# Patient Record
Sex: MALE | Race: ASIAN | NOT HISPANIC OR LATINO | Employment: OTHER | URBAN - METROPOLITAN AREA
[De-identification: names, ages, dates, MRNs, and addresses within clinical notes are randomized per-mention and may not be internally consistent; named-entity substitution may affect disease eponyms.]

---

## 2018-12-19 ENCOUNTER — OFFICE VISIT - HEALTHEAST (OUTPATIENT)
Dept: FAMILY MEDICINE | Facility: CLINIC | Age: 58
End: 2018-12-19

## 2018-12-19 DIAGNOSIS — Z00.00 ROUTINE GENERAL MEDICAL EXAMINATION AT A HEALTH CARE FACILITY: ICD-10-CM

## 2018-12-19 DIAGNOSIS — Z28.39 INCOMPLETE IMMUNIZATION STATUS: ICD-10-CM

## 2018-12-19 LAB
ALBUMIN SERPL-MCNC: 4.1 G/DL (ref 3.5–5)
ALBUMIN UR-MCNC: NEGATIVE MG/DL
ALP SERPL-CCNC: 58 U/L (ref 45–120)
ALT SERPL W P-5'-P-CCNC: 26 U/L (ref 0–45)
ANION GAP SERPL CALCULATED.3IONS-SCNC: 9 MMOL/L (ref 5–18)
APPEARANCE UR: CLEAR
AST SERPL W P-5'-P-CCNC: 17 U/L (ref 0–40)
ATRIAL RATE - MUSE: 77 BPM
BACTERIA #/AREA URNS HPF: ABNORMAL HPF
BILIRUB SERPL-MCNC: 0.6 MG/DL (ref 0–1)
BILIRUB UR QL STRIP: NEGATIVE
BUN SERPL-MCNC: 11 MG/DL (ref 8–22)
CALCIUM SERPL-MCNC: 9.5 MG/DL (ref 8.5–10.5)
CHLORIDE BLD-SCNC: 104 MMOL/L (ref 98–107)
CHOLEST SERPL-MCNC: 155 MG/DL
CO2 SERPL-SCNC: 27 MMOL/L (ref 22–31)
COLOR UR AUTO: YELLOW
CREAT SERPL-MCNC: 0.74 MG/DL (ref 0.7–1.3)
DIASTOLIC BLOOD PRESSURE - MUSE: NORMAL MMHG
ERYTHROCYTE [DISTWIDTH] IN BLOOD BY AUTOMATED COUNT: 12.2 % (ref 11–14.5)
FASTING STATUS PATIENT QL REPORTED: YES
GFR SERPL CREATININE-BSD FRML MDRD: >60 ML/MIN/1.73M2
GLUCOSE BLD-MCNC: 101 MG/DL (ref 70–125)
GLUCOSE UR STRIP-MCNC: NEGATIVE MG/DL
HCT VFR BLD AUTO: 45 % (ref 40–54)
HDLC SERPL-MCNC: 53 MG/DL
HGB BLD-MCNC: 15.3 G/DL (ref 14–18)
HGB UR QL STRIP: NEGATIVE
INTERPRETATION ECG - MUSE: NORMAL
KETONES UR STRIP-MCNC: NEGATIVE MG/DL
LDLC SERPL CALC-MCNC: 84 MG/DL
LEUKOCYTE ESTERASE UR QL STRIP: NEGATIVE
MCH RBC QN AUTO: 30.7 PG (ref 27–34)
MCHC RBC AUTO-ENTMCNC: 33.9 G/DL (ref 32–36)
MCV RBC AUTO: 91 FL (ref 80–100)
MUCOUS THREADS #/AREA URNS LPF: ABNORMAL LPF
NITRATE UR QL: NEGATIVE
P AXIS - MUSE: 63 DEGREES
PH UR STRIP: 5.5 [PH] (ref 5–8)
PLATELET # BLD AUTO: 281 THOU/UL (ref 140–440)
PMV BLD AUTO: 7.2 FL (ref 7–10)
POTASSIUM BLD-SCNC: 4.3 MMOL/L (ref 3.5–5)
PR INTERVAL - MUSE: 142 MS
PROT SERPL-MCNC: 7.1 G/DL (ref 6–8)
PSA SERPL-MCNC: 1.6 NG/ML (ref 0–3.5)
QRS DURATION - MUSE: 86 MS
QT - MUSE: 380 MS
QTC - MUSE: 430 MS
R AXIS - MUSE: 32 DEGREES
RBC # BLD AUTO: 4.96 MILL/UL (ref 4.4–6.2)
RBC #/AREA URNS AUTO: ABNORMAL HPF
SODIUM SERPL-SCNC: 140 MMOL/L (ref 136–145)
SP GR UR STRIP: >=1.03 (ref 1–1.03)
SQUAMOUS #/AREA URNS AUTO: ABNORMAL LPF
SYSTOLIC BLOOD PRESSURE - MUSE: NORMAL MMHG
T AXIS - MUSE: 50 DEGREES
TRIGL SERPL-MCNC: 92 MG/DL
UROBILINOGEN UR STRIP-ACNC: ABNORMAL
VENTRICULAR RATE- MUSE: 77 BPM
WBC #/AREA URNS AUTO: ABNORMAL HPF
WBC: 5.4 THOU/UL (ref 4–11)

## 2018-12-19 ASSESSMENT — MIFFLIN-ST. JEOR: SCORE: 1578.05

## 2018-12-20 LAB
BACTERIA SPEC CULT: NO GROWTH
HBV SURFACE AB SERPL IA-ACNC: NEGATIVE M[IU]/ML
HCV AB SERPL QL IA: NEGATIVE

## 2018-12-21 ENCOUNTER — COMMUNICATION - HEALTHEAST (OUTPATIENT)
Dept: FAMILY MEDICINE | Facility: CLINIC | Age: 58
End: 2018-12-21

## 2018-12-21 LAB — HAV IGG SER QL IA: POSITIVE

## 2019-05-03 ENCOUNTER — OFFICE VISIT - HEALTHEAST (OUTPATIENT)
Dept: FAMILY MEDICINE | Facility: CLINIC | Age: 59
End: 2019-05-03

## 2019-05-03 DIAGNOSIS — Z12.11 SCREEN FOR COLON CANCER: ICD-10-CM

## 2019-05-03 DIAGNOSIS — Z71.84 COUNSELING ABOUT TRAVEL: ICD-10-CM

## 2019-05-04 LAB — HBV SURFACE AG SERPL QL IA: NEGATIVE

## 2019-05-06 ENCOUNTER — COMMUNICATION - HEALTHEAST (OUTPATIENT)
Dept: FAMILY MEDICINE | Facility: CLINIC | Age: 59
End: 2019-05-06

## 2019-05-06 DIAGNOSIS — Z71.84 COUNSELING ABOUT TRAVEL: ICD-10-CM

## 2019-05-06 LAB — MEV IGG SER IA-ACNC: POSITIVE

## 2019-06-03 ENCOUNTER — COMMUNICATION - HEALTHEAST (OUTPATIENT)
Dept: FAMILY MEDICINE | Facility: CLINIC | Age: 59
End: 2019-06-03

## 2019-06-03 ENCOUNTER — AMBULATORY - HEALTHEAST (OUTPATIENT)
Dept: NURSING | Facility: CLINIC | Age: 59
End: 2019-06-03

## 2019-06-03 DIAGNOSIS — Z00.00 HEALTHCARE MAINTENANCE: ICD-10-CM

## 2020-03-11 ENCOUNTER — OFFICE VISIT - HEALTHEAST (OUTPATIENT)
Dept: FAMILY MEDICINE | Facility: CLINIC | Age: 60
End: 2020-03-11

## 2020-03-11 DIAGNOSIS — Z00.00 ROUTINE GENERAL MEDICAL EXAMINATION AT A HEALTH CARE FACILITY: ICD-10-CM

## 2020-03-11 DIAGNOSIS — N52.9 ERECTILE DYSFUNCTION, UNSPECIFIED ERECTILE DYSFUNCTION TYPE: ICD-10-CM

## 2020-03-11 LAB
ALBUMIN SERPL-MCNC: 4 G/DL (ref 3.5–5)
ALBUMIN UR-MCNC: NEGATIVE MG/DL
ALP SERPL-CCNC: 66 U/L (ref 45–120)
ALT SERPL W P-5'-P-CCNC: 34 U/L (ref 0–45)
AMORPH CRY #/AREA URNS HPF: ABNORMAL /[HPF]
ANION GAP SERPL CALCULATED.3IONS-SCNC: 9 MMOL/L (ref 5–18)
APPEARANCE UR: CLEAR
AST SERPL W P-5'-P-CCNC: 19 U/L (ref 0–40)
ATRIAL RATE - MUSE: 86 BPM
BACTERIA #/AREA URNS HPF: ABNORMAL HPF
BILIRUB SERPL-MCNC: 0.6 MG/DL (ref 0–1)
BILIRUB UR QL STRIP: NEGATIVE
BUN SERPL-MCNC: 10 MG/DL (ref 8–22)
CALCIUM SERPL-MCNC: 9.2 MG/DL (ref 8.5–10.5)
CHLORIDE BLD-SCNC: 103 MMOL/L (ref 98–107)
CHOLEST SERPL-MCNC: 159 MG/DL
CO2 SERPL-SCNC: 28 MMOL/L (ref 22–31)
COLOR UR AUTO: YELLOW
CREAT SERPL-MCNC: 0.82 MG/DL (ref 0.7–1.3)
DIASTOLIC BLOOD PRESSURE - MUSE: NORMAL
ERYTHROCYTE [DISTWIDTH] IN BLOOD BY AUTOMATED COUNT: 12.6 % (ref 11–14.5)
FASTING STATUS PATIENT QL REPORTED: YES
GFR SERPL CREATININE-BSD FRML MDRD: >60 ML/MIN/1.73M2
GLUCOSE BLD-MCNC: 94 MG/DL (ref 70–125)
GLUCOSE UR STRIP-MCNC: NEGATIVE MG/DL
HCT VFR BLD AUTO: 44.6 % (ref 40–54)
HDLC SERPL-MCNC: 52 MG/DL
HGB BLD-MCNC: 15 G/DL (ref 14–18)
HGB UR QL STRIP: NEGATIVE
INTERPRETATION ECG - MUSE: NORMAL
KETONES UR STRIP-MCNC: NEGATIVE MG/DL
LDLC SERPL CALC-MCNC: 84 MG/DL
LEUKOCYTE ESTERASE UR QL STRIP: NEGATIVE
MCH RBC QN AUTO: 30.6 PG (ref 27–34)
MCHC RBC AUTO-ENTMCNC: 33.7 G/DL (ref 32–36)
MCV RBC AUTO: 91 FL (ref 80–100)
NITRATE UR QL: NEGATIVE
P AXIS - MUSE: 45 DEGREES
PH UR STRIP: 5.5 [PH] (ref 5–8)
PLATELET # BLD AUTO: 257 THOU/UL (ref 140–440)
PMV BLD AUTO: 7.7 FL (ref 7–10)
POTASSIUM BLD-SCNC: 4.1 MMOL/L (ref 3.5–5)
PR INTERVAL - MUSE: 122 MS
PROT SERPL-MCNC: 7 G/DL (ref 6–8)
PSA SERPL-MCNC: 2.2 NG/ML (ref 0–4.5)
QRS DURATION - MUSE: 86 MS
QT - MUSE: 384 MS
QTC - MUSE: 459 MS
R AXIS - MUSE: -7 DEGREES
RBC # BLD AUTO: 4.91 MILL/UL (ref 4.4–6.2)
RBC #/AREA URNS AUTO: ABNORMAL HPF
SODIUM SERPL-SCNC: 140 MMOL/L (ref 136–145)
SP GR UR STRIP: >=1.03 (ref 1–1.03)
SQUAMOUS #/AREA URNS AUTO: ABNORMAL LPF
SYSTOLIC BLOOD PRESSURE - MUSE: NORMAL
T AXIS - MUSE: 45 DEGREES
TRIGL SERPL-MCNC: 117 MG/DL
UROBILINOGEN UR STRIP-ACNC: NORMAL
VENTRICULAR RATE- MUSE: 86 BPM
WBC #/AREA URNS AUTO: ABNORMAL HPF
WBC: 5.5 THOU/UL (ref 4–11)

## 2020-03-11 ASSESSMENT — MIFFLIN-ST. JEOR: SCORE: 1602.88

## 2020-03-12 ENCOUNTER — COMMUNICATION - HEALTHEAST (OUTPATIENT)
Dept: FAMILY MEDICINE | Facility: CLINIC | Age: 60
End: 2020-03-12

## 2020-06-18 ENCOUNTER — COMMUNICATION - HEALTHEAST (OUTPATIENT)
Dept: FAMILY MEDICINE | Facility: CLINIC | Age: 60
End: 2020-06-18

## 2021-05-28 NOTE — TELEPHONE ENCOUNTER
Left message to call back for: medication problem  Information to relay to patient:  Below message

## 2021-05-28 NOTE — TELEPHONE ENCOUNTER
Reason contacted:  Medication problem  Information relayed:  Spoke with patient who would like oral typhoid (Vivotif) resent [facility] [address] here in [place].    Please resend rx.     Additional questions:  No  Further follow-up needed:  Yes  Okay to leave a detailed message:  Yes

## 2021-05-28 NOTE — TELEPHONE ENCOUNTER
Central PA team  [phone]  Pool: HE PA MED ([id number])          PA has been initiated.       PA form completed and faxed insurance via Cover My Meds     Key:  [id number]     Medication:  VIVOTIF CAPSULES    Insurance:  [facility]        Response will be received via fax and may take up to 5-10 business days depending on plan

## 2021-05-28 NOTE — TELEPHONE ENCOUNTER
Patient Returning Call  Reason for call:  Patient called back.  Information relayed to patient:  Informed of message below.  Patient states he wants to speak with Lashon.  Please call him back.  Patient has additional questions:  Yes  If YES, what are your questions/concerns:  As above.  Okay to leave a detailed message?: Yes

## 2021-05-28 NOTE — TELEPHONE ENCOUNTER
Request has been submitted via [facility]. Waiting for questions to generate before completing PA.

## 2021-05-28 NOTE — TELEPHONE ENCOUNTER
Fax received from [facility], they have started the Prior Authorization Process via Cover My Meds    CoverMyMeds Key: [id number]    Medication Name:   typhoid (VIVOTIF) SR capsule 4 capsule 0 5/3/2019     Sig - Route: Take 1 capsule by mouth every other day. - Oral          Insurance Plan:   [facility]  Patient ID: [id number]    Please complete the PA process

## 2021-05-28 NOTE — PROGRESS NOTES
Patient ID: Wayne Jack is a 58 y.o. male.  /76   Pulse 100   Wt 185 lb (83.9 kg)   SpO2 96%   BMI 30.32 kg/m      Assessment/Plan:                   Diagnoses and all orders for this visit:    Screen for colon cancer    Counseling about travel  -     Rubeola Antibody, IgG  -     typhoid (VIVOTIF) SR capsule; Take 1 capsule by mouth every other day.  Dispense: 4 capsule; Refill: 0  -     Hepatitis B Surface Antigen (HBsAG)    Other orders  -     Cancel: Ambulatory referral for Colonoscopy  -     Td, Preservative Free (green label)  -     Hepatitis B Vaccine Age 20 years and above          DISCUSSION  Today we spent 25 minutes reviewing all of these considerations outlined below.  We will immunized with tetanus and hepatitis B #1 today.  Will obtain laboratory testing for hepatitis B surface antigen and to check a measles vaccination titer to see if he is immune.  If he is not immune to measles we will plan to immunize prior to travel.  Subjective:     HPI    Wayne Jack is a 58 y.o. male who will be traveling to [facility] in June 2019.  He will be staying in the city of [place] and visiting family.  He is travel to [facility] previously.  Today we reviewed the CDC website recommendations regarding travel and more specifically regarding immunization and disease prevention.    This past year he was screened with laboratory testing including hepatitis B surface antibody test which was negative indicating he is not immune to hepatitis B.  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  He does not report any specific situation which makes him concerned but would like to have testing.  We discussed obtaining a hepatitis B surface antigen test for this purpose.  We also discussed that even though he reports having had vaccination series for hepatitis B he is not immune.  At this time I would recommend that he undergo the vaccination  series and that we plan to check a titer upon completion.  Did discuss that we did confirm immunity for hepatitis A and that he does not have any sign of hepatitis C infection in the past.    We reviewed the need for other immunizations.  He is due for a tetanus vaccine and will get this today.  We discussed recent concerns regarding measles.  He does not have any documentation or specific knowledge about his measles immunity.  We discussed that based on all factors I think the best approach would be to check a measles titer and either immunize based on that or provide him with information for proof of immunity if he is immune.    Based on his travel plans according to the [facility] website he will not need malaria prophylaxis and this is discussed.  Also no indication for rabies vaccination or Japanese encephalitis.    We discussed typhoid immunization and felt the best way to accomplish this based on the timeframe would be to use the oral typhoid vaccine and a prescription is sent to his pharmacy.    Recommended that he review further information regarding travel to [facility] on the [email or web address] website.  Discussed basics of travel today.    Review of Systems  Complete review of systems is obtained.  Other than the specific considerations noted above complete review of systems is negative.          Objective:   Medications:  Current Outpatient Medications   Medication Sig     typhoid (VIVOTIF) SR capsule Take 1 capsule by mouth every other day.       Allergies:  No Known Allergies    Tobacco:   reports that he has never smoked. He has never used smokeless tobacco.     Physical Exam          /76   Pulse 100   Wt 185 lb (83.9 kg)   SpO2 96%   BMI 30.32 kg/m          General Appearance:    Alert, cooperative, no distress

## 2021-06-02 VITALS — HEIGHT: 66 IN | BODY MASS INDEX: 29.88 KG/M2 | WEIGHT: 185.9 LBS

## 2021-06-03 VITALS — WEIGHT: 185 LBS | BODY MASS INDEX: 30.32 KG/M2

## 2021-06-04 VITALS
HEART RATE: 85 BPM | SYSTOLIC BLOOD PRESSURE: 112 MMHG | TEMPERATURE: 98.5 F | WEIGHT: 191.38 LBS | BODY MASS INDEX: 30.76 KG/M2 | HEIGHT: 66 IN | OXYGEN SATURATION: 95 % | DIASTOLIC BLOOD PRESSURE: 60 MMHG

## 2021-06-06 NOTE — PROGRESS NOTES
CC: I am here for a physical.  I feel well.    HPI: I am seeing Wayne for his annual physical exam.  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  He is enjoyed a good year of health.  He is scheduled for colonoscopy and is getting a little bit of cold feet syndrome but we did talk this over and the pros and cons of therapy and prophylaxis have convince him to go ahead with the already scheduled colonoscopy.  Fortunately his prostate exam was normal today.  I have reviewed his past medical history social history surgeries and allergies.  He denies any visual concerns he has some slight tinnitus in the right ear but it does not really bother him this is not a new finding its been there for a couple of years.  We did discuss a cats diet last year but he has been unable to cut back on his tea and his coffee the TV and cultural problem.  Denies any dysphasia shortness of breath dyspnea chest pain angina abdominal pain diarrhea constipation urgency frequency dysuria he has mild ED but is able to perform at least once per week and he and his wife are happy with the situation.  He has a son who is a senior in college and is in [employer] sciences.  All medical questions asked were answered I personally reviewed family social history surgeries allergies problems list pleasure to see him again his EKG was reviewed with him and we will go over his test with him via computer or phone call.      Review of Systems: A complete 14 point review of systems was obtained and is negative or as stated in the history of present illness.    No past medical history on file.  No family history on file.  No past surgical history on file.  Social History     Tobacco Use     Smoking status: Never Smoker     Smokeless tobacco: Never Used   Substance Use Topics     Alcohol use: No     Frequency: Never     Drug use: No       PE:   /60 (Patient Site: Right Arm, Patient Position:  "Sitting, Cuff Size: Adult Large)   Pulse 85   Temp 98.5  F (36.9  C) (Oral)   Ht 5' 5.5\" (1.664 m)   Wt 191 lb 6 oz (86.8 kg)   SpO2 95%   BMI 31.36 kg/m       General Appearance:  Alert, cooperative, no distress  Head:  Normocephalic, no obvious abnormality  Ears: TM anatomy normal  Eyes:  PERRL, EOM's intact, conjunctiva and corneas clear  Nose:  Nares symmetrical, septum midline, mucosa pink, no sinus tenderness  Throat:  Lips, tongue, and mucosa are moist, pink, and intact  Neck:  Supple, symmetrical, trachea midline, no adenopathy; thyroid: no enlargement, symmetric,no tenderness/mass/nodules; no carotid bruit, no JVD  Back:  Symmetrical, no curvature, ROM normal, no CVA tenderness  Chest/Breast:  No mass or tenderness  Lungs:  Clear to auscultation bilaterally, respirations unlabored   Heart:  Normal PMI, regular rate & rhythm, S1 and S2 normal, no murmurs, rubs, or gallops  Abdomen:  Soft, non-tender, bowel sounds active all four quadrants, no mass, or organomegaly  Musculoskeletal:  Tone and strength strong and symmetrical, all extremities  Lymphatic:  No adenopathy  Skin/Hair/Nails:  Skin warm, dry, and intact, no rashes  Neurologic:  Alert and oriented x3, no cranial nerve deficits, normal strength and tone, gait steady  Extremities:  No edema.  Alfonso's sign negative.    Genitourinary: Circumcised male testes normal no varicocele prostate normal external rectal exam normal  Pulses:  Equal bilaterally    Impression:     1. Routine general medical examination at a health care facility  Comprehensive Metabolic Panel    HM2(CBC w/o Differential)    Lipid Cascade    PSA (Prostatic-Specific Antigen), Annual Screen    Urinalysis-UC if Indicated    Electrocardiogram Perform - Clinic   2. Erectile dysfunction, unspecified erectile dysfunction type  Comprehensive Metabolic Panel    Lipid Cascade        PLAN:   1. Routine general medical examination at a health care facility  omprehensive Metabolic Panel  - " HM2(CBC w/o Differential)  - Lipid Cascade  - PSA (Prostatic-Specific Antigen), Annual Screen  - Urinalysis-UC if Indicated  - Electrocardiogram Perform - Clinic    2. Erectile dysfunction, unspecified erectile dysfunction type    - Comprehensive Metabolic Panel  - Lipid Cascade      I have had an Advance Directives discussion with the patient.        This note has been dictated using voice recognition software. Any grammatical or context distortions are unintentional and inherent to the the software.

## 2021-06-09 NOTE — TELEPHONE ENCOUNTER
Email sent to customer advocacy to adjust the balance. I will contact patient once this adjustment has been made.     Samaria De La Cruz RN

## 2021-06-09 NOTE — TELEPHONE ENCOUNTER
I recall everything.  He is a very nice patient.  What he said I put in the notes(2 different occasions). I am glad he is well.  If he wants another Doctor I suggest one of my male partners. If he wants a no charge visit with me to discuss this ,in my opinion,transient problem I will accomodate him. Wish him well in any event. Regards

## 2021-06-09 NOTE — TELEPHONE ENCOUNTER
Dr Manzano- I read your note. I am not certain you recall this visit, but we need to follow up with him.

## 2021-06-09 NOTE — TELEPHONE ENCOUNTER
Who is calling:  The patient   Reason for Call:  The patient states he saw Rian Manzano MD in March 2020 . The patient states he received a bill for the copayment on the visit and that Rian Manzano MD noted the patient had erectile dysfunction which the patient states he does not have. The patient states if this doesn't get straightened out he wants to switch clinics. The patient is requesting a phone call from Rian Manzano MD office.   Date of last appointment with primary care:   Okay to leave a detailed message: Yes

## 2021-06-09 NOTE — TELEPHONE ENCOUNTER
Called patient and listened to his concerns regarding a split bill related to his recent physical exam. I do not see ED listed as a diagnosis o.n his snapshot but it is in the Physical Exam    I told the patient I would discuss this with our coding team and get back to him within a few days.   Pt is happy to continue seeing Dr. Manzano. This really was a billing issue.   I will route to Dr. Manzano for further discussion.     Samaria De La Cruz RN

## 2021-06-16 NOTE — TELEPHONE ENCOUNTER
Telephone Encounter by Felipa Akins at 5/8/2019  1:18 PM     Author: Felipa Akins Service: -- Author Type: --    Filed: 5/8/2019  1:20 PM Encounter Date: 5/6/2019 Status: Signed    : Felipa Akins       PRIOR AUTHORIZATION DENIED    Denial Rational: MEDICATION IS EXCLUDED FROM THE PATIENTS BENEFIT PLAN. PATIENT WILL HAVE TO PAY OUT OF POCKET FOR MEDICATION.        Appeal Information: PLEASE SEE ABOVE

## 2021-06-19 NOTE — LETTER
Letter by Nicolas Toth MD at      Author: Nicolas Toth MD Service: -- Author Type: --    Filed:  Encounter Date: 5/6/2019 Status: (Other)         Wayne Jack  [address] [address]             May 6, 2019         Dear Mr. Jack,    Below are the results from your recent visit:    Resulted Orders   Rubeola Antibody, IgG   Result Value Ref Range    Rubeola Antibody, IgG Positive     Narrative    Negative: Absence of detectable measles virus IgG antibodies. A negative result generally indicates that the patient has not been infected and is susceptible to measles.   Equivocal: Suggest recollection no less than one to two weeks later.  Positive: Presence of detectable measles virus IgG antibodies. A positive result generally indicates exposure to measles virus or previous vaccination.   Hepatitis B Surface Antigen (HBsAG)   Result Value Ref Range    Hepatitis B Surface Ag Negative Negative     Rubeola is the technical name for measles.  The positive result for the IgG rubeola antibody indicates that you have immunity to measles.  You should be protected and you would not develop infection even if he were exposed.    The hepatitis B surface antigen test is negative, this means you do not have any sign of hepatitis B infection.  This is good and what we wanted to find.      Please call with questions or contact us using [employer].    Sincerely,        Electronically signed by Nicolas Toth MD

## 2021-06-20 NOTE — LETTER
Letter by Rian Manzano MD at      Author: Rian Manzano MD Service: -- Author Type: --    Filed:  Encounter Date: 3/12/2020 Status: (Other)         Wayne Jack  [address] [address]             March 12, 2020         Dear Mr. Jack,    Below are the results from your recent visit:    Resulted Orders   Comprehensive Metabolic Panel   Result Value Ref Range    Sodium 140 136 - 145 mmol/L    Potassium 4.1 3.5 - 5.0 mmol/L    Chloride 103 98 - 107 mmol/L    CO2 28 22 - 31 mmol/L    Anion Gap, Calculation 9 5 - 18 mmol/L    Glucose 94 70 - 125 mg/dL    BUN 10 8 - 22 mg/dL    Creatinine 0.82 0.70 - 1.30 mg/dL    GFR MDRD Af Amer >60 >60 mL/min/1.73m2    GFR MDRD Non Af Amer >60 >60 mL/min/1.73m2    Bilirubin, Total 0.6 0.0 - 1.0 mg/dL    Calcium 9.2 8.5 - 10.5 mg/dL    Protein, Total 7.0 6.0 - 8.0 g/dL    Albumin 4.0 3.5 - 5.0 g/dL    Alkaline Phosphatase 66 45 - 120 U/L    AST 19 0 - 40 U/L    ALT 34 0 - 45 U/L    Narrative    Fasting Glucose reference range is 70-99 mg/dL per  American Diabetes Association (ADA) guidelines.   HM2(CBC w/o Differential)   Result Value Ref Range    WBC 5.5 4.0 - 11.0 thou/uL    RBC 4.91 4.40 - 6.20 mill/uL    Hemoglobin 15.0 14.0 - 18.0 g/dL    Hematocrit 44.6 40.0 - 54.0 %    MCV 91 80 - 100 fL    MCH 30.6 27.0 - 34.0 pg    MCHC 33.7 32.0 - 36.0 g/dL    RDW 12.6 11.0 - 14.5 %    Platelets 257 140 - 440 thou/uL    MPV 7.7 7.0 - 10.0 fL   Lipid Cascade   Result Value Ref Range    Cholesterol 159 <=199 mg/dL    Triglycerides 117 <=149 mg/dL    HDL Cholesterol 52 >=40 mg/dL    LDL Calculated 84 <=129 mg/dL    Patient Fasting > 8hrs? Yes    PSA (Prostatic-Specific Antigen), Annual Screen   Result Value Ref Range    PSA 2.2 0.0 - 4.5 ng/mL    Narrative    Method is Abbott Prostate-Specific Antigen (PSA)  Standard-WHO 1st International (90:10)   Urinalysis   Result Value Ref Range    Color, UA Yellow Colorless, Yellow, Straw, Light Yellow    Clarity, UA Clear Clear     Glucose, UA Negative Negative    Bilirubin, UA Negative Negative    Ketones, UA Negative Negative    Specific Gravity, UA >=1.030 1.005 - 1.030    Blood, UA Negative Negative    pH, UA 5.5 5.0 - 8.0    Protein, UA Negative Negative mg/dL    Urobilinogen, UA 0.2 E.U./dL 0.2 E.U./dL, 1.0 E.U./dL    Nitrite, UA Negative Negative    Leukocytes, UA Negative Negative   Urine,Microscopic   Result Value Ref Range    Bacteria, UA None Seen None Seen hpf    RBC, UA 0-2 None Seen, 0-2 hpf    WBC, UA 0-5 None Seen, 0-5 hpf    Squam Epithel, UA 0-5 None Seen, 0-5 lpf    Amorphous, UA Many (!) None Seen     All your tests are normal.    Please call with questions or contact us using [employer].    Sincerely,        Electronically signed by Rian Manzano MD

## 2021-06-22 NOTE — PROGRESS NOTES
CC: I need a physical; it has been 10 years since I saw ; I would like my hepatitis status checked    HPI: This patient has not been seen by a physician in almost 10 years; he previously was cared for at the [facility].  Had colonoscopy 9 years ago which was negative.  Patient is complaining of tinnitus on the right side.  Patient has mild ED.  He is able to get an erection but it is not really available on demand.  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  Patient is under some stress and consumes a lot of caffeine.  We pointed out that over consumption and stress are causes of tinnitus.  Patient agrees that when he drinks too much coffee or tea that his tinnitus is worse at night.  He plans on changing his habits.  He is  has 3 children.  Has no constitutional complaints no visual changes hearing loss other than the tinnitus problem no dysphasia shortness of breath dyspnea chest pain angina abdominal pain diarrhea constipation urgency frequency dysuria ED as stated we did discuss this patient because of acupuncture habits once his immunization status checked he was immunized for hepatitis B he believes 30 years ago but is unsure about the other hepatitis A or hepatitis C so we will therefore check all of that.  Patient promises to come in for annual exams from this point I will see him for follow-up yearly pleasure to meet him this was a 45-minute visit.      [unfilled]    No past medical history on file.  No family history on file.  No past surgical history on file.  Social History     Tobacco Use     Smoking status: Not on file   Substance Use Topics     Alcohol use: Not on file     Drug use: Not on file       PE:   There were no vitals taken for this visit.   General Appearance:  Alert, cooperative, no distress  Head:  Normocephalic, no obvious abnormality  Ears: TM anatomy normal  Eyes:  PERRL, EOM's intact, conjunctiva and corneas  clear  Nose:  Nares symmetrical, septum midline, mucosa pink, no sinus tenderness  Throat:  Lips, tongue, and mucosa are moist, pink, and intact  Neck:  Supple, symmetrical, trachea midline, no adenopathy; thyroid: no enlargement, symmetric,no tenderness/mass/nodules; no carotid bruit, no JVD  Back:  Symmetrical, no curvature, ROM normal, no CVA tenderness  Chest/Breast:  No mass or tenderness  Lungs:  Clear to auscultation bilaterally, respirations unlabored   Heart:  Normal PMI, regular rate & rhythm, S1 and S2 normal, no murmurs, rubs, or gallops  Abdomen:  Soft, non-tender, bowel sounds active all four quadrants, no mass, or organomegaly  Musculoskeletal:  Tone and strength strong and symmetrical, all extremities  Lymphatic:  No adenopathy  Skin/Hair/Nails:  Skin warm, dry, and intact, no rashes  Neurologic:  Alert and oriented x3, no cranial nerve deficits, normal strength and tone, gait steady  Extremities:  No edema.  Alfonos's sign negative.    Genitourinary: Uncircumcised male testes are down prostate normal at 40 mL  Pulses:  Equal bilaterally  Impression:     1. Routine general medical examination at a health care facility  Comprehensive Metabolic Panel    Electrocardiogram Perform - Clinic    HM2(CBC w/o Differential)    Lipid Cascade    PSA (Prostatic-Specific Antigen), Annual Screen    Urinalysis-UC if Indicated        PLAN:   1. Routine general medical examination at a [facility] facility  Workup to include  - Comprehensive Metabolic Panel  - Electrocardiogram Perform - Clinic  - HM2(CBC w/o Differential)  - Lipid Cascade  - PSA (Prostatic-Specific Antigen), Annual Screen  - Urinalysis-UC if Indicated      I have had an Advance Directives discussion with the patient.        This note has been dictated using voice recognition software. Any grammatical or context distortions are unintentional and inherent to the the software.

## 2021-07-03 NOTE — ADDENDUM NOTE
Addendum Note by Rachel Martin at 3/11/2020  7:20 AM     Author: Rachel Martin Service: -- Author Type:     Filed: 3/11/2020  8:10 AM Encounter Date: 3/11/2020 Status: Signed    : Rachel Martin ()    Addended by: RACHEL MARTIN on: 3/11/2020 08:10 AM        Modules accepted: Orders

## 2022-11-18 ENCOUNTER — TRANSFERRED RECORDS (OUTPATIENT)
Dept: HEALTH INFORMATION MANAGEMENT | Facility: CLINIC | Age: 62
End: 2022-11-18

## 2022-11-18 LAB
ALT SERPL-CCNC: 22 IU/L (ref 0–44)
AST SERPL-CCNC: 19 IU/L (ref 0–40)

## 2022-12-16 ENCOUNTER — OFFICE VISIT (OUTPATIENT)
Dept: FAMILY MEDICINE | Facility: CLINIC | Age: 62
End: 2022-12-16
Payer: COMMERCIAL

## 2022-12-16 VITALS
SYSTOLIC BLOOD PRESSURE: 120 MMHG | DIASTOLIC BLOOD PRESSURE: 70 MMHG | BODY MASS INDEX: 30.04 KG/M2 | HEART RATE: 74 BPM | OXYGEN SATURATION: 97 % | RESPIRATION RATE: 16 BRPM | HEIGHT: 65 IN | TEMPERATURE: 97.8 F | WEIGHT: 180.3 LBS

## 2022-12-16 DIAGNOSIS — Z12.5 SCREENING FOR PROSTATE CANCER: ICD-10-CM

## 2022-12-16 DIAGNOSIS — Z11.4 SCREENING FOR HIV (HUMAN IMMUNODEFICIENCY VIRUS): Primary | ICD-10-CM

## 2022-12-16 DIAGNOSIS — Z00.00 ANNUAL PHYSICAL EXAM: ICD-10-CM

## 2022-12-16 LAB
ALBUMIN SERPL BCG-MCNC: 4.5 G/DL (ref 3.5–5.2)
ALP SERPL-CCNC: 64 U/L (ref 40–129)
ALT SERPL W P-5'-P-CCNC: 19 U/L (ref 10–50)
ANION GAP SERPL CALCULATED.3IONS-SCNC: 11 MMOL/L (ref 7–15)
AST SERPL W P-5'-P-CCNC: 24 U/L (ref 10–50)
BILIRUB SERPL-MCNC: 0.6 MG/DL
BUN SERPL-MCNC: 9.5 MG/DL (ref 8–23)
CALCIUM SERPL-MCNC: 9 MG/DL (ref 8.8–10.2)
CHLORIDE SERPL-SCNC: 103 MMOL/L (ref 98–107)
CHOLEST SERPL-MCNC: 178 MG/DL
CREAT SERPL-MCNC: 0.74 MG/DL (ref 0.67–1.17)
DEPRECATED HCO3 PLAS-SCNC: 26 MMOL/L (ref 22–29)
ERYTHROCYTE [DISTWIDTH] IN BLOOD BY AUTOMATED COUNT: 12.9 % (ref 10–15)
GFR SERPL CREATININE-BSD FRML MDRD: >90 ML/MIN/1.73M2
GLUCOSE SERPL-MCNC: 91 MG/DL (ref 70–99)
HCT VFR BLD AUTO: 44 % (ref 40–53)
HDLC SERPL-MCNC: 60 MG/DL
HGB BLD-MCNC: 14.3 G/DL (ref 13.3–17.7)
LDLC SERPL CALC-MCNC: 96 MG/DL
MCH RBC QN AUTO: 29.9 PG (ref 26.5–33)
MCHC RBC AUTO-ENTMCNC: 32.5 G/DL (ref 31.5–36.5)
MCV RBC AUTO: 92 FL (ref 78–100)
NONHDLC SERPL-MCNC: 118 MG/DL
PLATELET # BLD AUTO: 261 10E3/UL (ref 150–450)
POTASSIUM SERPL-SCNC: 3.8 MMOL/L (ref 3.4–5.3)
PROT SERPL-MCNC: 7.4 G/DL (ref 6.4–8.3)
PSA SERPL-MCNC: 2.81 NG/ML (ref 0–4.5)
RBC # BLD AUTO: 4.78 10E6/UL (ref 4.4–5.9)
SODIUM SERPL-SCNC: 140 MMOL/L (ref 136–145)
TRIGL SERPL-MCNC: 111 MG/DL
WBC # BLD AUTO: 6.4 10E3/UL (ref 4–11)

## 2022-12-16 PROCEDURE — G0103 PSA SCREENING: HCPCS | Performed by: STUDENT IN AN ORGANIZED HEALTH CARE EDUCATION/TRAINING PROGRAM

## 2022-12-16 PROCEDURE — 85027 COMPLETE CBC AUTOMATED: CPT | Performed by: STUDENT IN AN ORGANIZED HEALTH CARE EDUCATION/TRAINING PROGRAM

## 2022-12-16 PROCEDURE — 99396 PREV VISIT EST AGE 40-64: CPT | Performed by: STUDENT IN AN ORGANIZED HEALTH CARE EDUCATION/TRAINING PROGRAM

## 2022-12-16 PROCEDURE — 36415 COLL VENOUS BLD VENIPUNCTURE: CPT | Performed by: STUDENT IN AN ORGANIZED HEALTH CARE EDUCATION/TRAINING PROGRAM

## 2022-12-16 PROCEDURE — 80061 LIPID PANEL: CPT | Performed by: STUDENT IN AN ORGANIZED HEALTH CARE EDUCATION/TRAINING PROGRAM

## 2022-12-16 PROCEDURE — 80053 COMPREHEN METABOLIC PANEL: CPT | Performed by: STUDENT IN AN ORGANIZED HEALTH CARE EDUCATION/TRAINING PROGRAM

## 2022-12-16 ASSESSMENT — ENCOUNTER SYMPTOMS
ABDOMINAL PAIN: 0
NAUSEA: 0
FREQUENCY: 0
CONSTIPATION: 0
DYSURIA: 0
COUGH: 0
HEADACHES: 0
WEAKNESS: 0
DIZZINESS: 0
ARTHRALGIAS: 0
PALPITATIONS: 0
HEMATURIA: 0
CHILLS: 0
JOINT SWELLING: 0
DIARRHEA: 0
SORE THROAT: 0
HEARTBURN: 0
EYE PAIN: 0
PARESTHESIAS: 0
NERVOUS/ANXIOUS: 0
SHORTNESS OF BREATH: 0
MYALGIAS: 0
FEVER: 0
HEMATOCHEZIA: 0

## 2022-12-16 ASSESSMENT — PAIN SCALES - GENERAL: PAINLEVEL: NO PAIN (0)

## 2022-12-16 NOTE — PROGRESS NOTES
Assessment/ Plan     62-year-old male with unremarkable past medical history who presents for annual physical exam.    1. Screening for HIV (human immunodeficiency virus)  Low risk    2. Annual physical exam  - Comprehensive metabolic panel (BMP + Alb, Alk Phos, ALT, AST, Total. Bili, TP); Future  - CBC with platelets; Future  - Lipid panel reflex to direct LDL Fasting; Future    3. Screening for prostate cancer  - PSA, screen; Future    Follow-up in: 1 year for physical    Jae Rodriguez MD    Subjective:     Wayne Jack is a 62 year old male who presents for an annual exam.     Chief Complaint   Patient presents with     Physical     Establish care and physical. No concerns        Colonoscopy: 11/22- does not know when he is due- he will bring in results  PSA:  Prostate Specific Antigen Screen   Date Value Ref Range Status   03/11/2020 2.2 0.0 - 4.5 ng/mL Final     Answers for HPI/ROS submitted by the patient on 12/16/2022  Frequency of exercise:: 6-7 days/week  Getting at least 3 servings of Calcium per day:: Yes  Diet:: Regular (no restrictions)  Taking medications regularly:: Yes  Medication side effects:: None  Bi-annual eye exam:: Yes  Dental care twice a year:: Yes  Sleep apnea or symptoms of sleep apnea:: None  abdominal pain: No  Blood in stool: No  Blood in urine: No  chest pain: No  chills: No  congestion: No  constipation: No  cough: No  diarrhea: No  dizziness: No  ear pain: No  eye pain: No  nervous/anxious: No  fever: No  frequency: No  genital sores: No  headaches: No  hearing loss: No  heartburn: No  arthralgias: No  joint swelling: No  peripheral edema: No  mood changes: No  myalgias: No  nausea: No  dysuria: No  palpitations: No  Skin sensation changes: No  sore throat: No  urgency: No  rash: No  shortness of breath: No  visual disturbance: No  weakness: No  impotence: No  penile discharge: No  Additional concerns today:: No  Duration of exercise:: 30-45 minutes    Immunization History  "  Administered Date(s) Administered     HepB-Adult 05/03/2019, 06/03/2019     Influenza (H1N1) 12/03/2009     TD (ADULT, 7+) 05/03/2019     Tdap (Adacel,Boostrix) 12/19/2007     Immunization status: due today.     No current outpatient medications on file.     History reviewed. No pertinent past medical history.  History reviewed. No pertinent surgical history.  Patient has no known allergies.  History reviewed. No pertinent family history.  Social History     Socioeconomic History     Marital status:      Spouse name: Not on file     Number of children: Not on file     Years of education: Not on file     Highest education level: Not on file   Occupational History     Not on file   Tobacco Use     Smoking status: Never     Passive exposure: Never     Smokeless tobacco: Never   Substance and Sexual Activity     Alcohol use: No     Drug use: No     Sexual activity: Yes     Partners: Female     Comment: wife- monogmous   Other Topics Concern     Not on file   Social History Narrative     Not on file     Social Determinants of Health     Financial Resource Strain: Not on file   Food Insecurity: Not on file   Transportation Needs: Not on file   Physical Activity: Not on file   Stress: Not on file   Social Connections: Not on file   Intimate Partner Violence: Not on file   Housing Stability: Not on file       Review of Systems  Complete ROS negative except as noted in the HPI    Objective:      Vitals:    12/16/22 1237   BP: 120/70   BP Location: Left arm   Patient Position: Sitting   Cuff Size: Adult Regular   Pulse: 74   Resp: 16   Temp: 97.8  F (36.6  C)   TempSrc: Temporal   SpO2: 97%   Weight: 81.8 kg (180 lb 4.8 oz)   Height: 1.657 m (5' 5.25\")       General appearance: Alert, cooperative, no distress, appears stated age  Head: Normocephalic, atraumatic, without obvious abnormality  EARS: TM's gray dull with structures seen bilaterally  Eyes: Pupils equal round, reactive.  Conjunctiva clear.  Nose: Nares " normal, no drainage.  Throat: Lips, mucosa, tongue normal mucosa pink and moist  Neck: Supple, symmetric, trachea midline, no adenopathy.  No thyroid enlargement, tenderness or nodules.    Lungs: Clear to auscultation bilaterally, no wheezing or crackles present.  Respirations unlabored  Heart: Regular rate and rhythm, normal S1 and S2, no murmur, rub or gallop.  Abdomen: Soft, nontender, nondistended.  Bowel sounds active in all 4 quadrants.  No masses or organomegaly.  Extremities: Extremities normal, atraumatic.  No cyanosis or edema.  Skin: Skin color, texture, turgor normal no rashes or lesions on limited skin exam  Neurologic: CN II through XII intact, normal strength.      Jae Givens MD

## 2022-12-16 NOTE — LETTER
December 20, 2022      Wayne Jack  [address] [facility] [address]        Dear ,    We are writing to inform you of your test results.        Resulted Orders   PSA, screen   Result Value Ref Range    Prostate Specific Antigen Screen 2.81 0.00 - 4.50 ng/mL    Narrative    This result is obtained using the Roche Elecsys total PSA method on the grant [id number] immunoassay analyzer. Results obtained with different assay methods or kits cannot be used interchangeably.   Comprehensive metabolic panel (BMP + Alb, Alk Phos, ALT, AST, Total. Bili, TP)   Result Value Ref Range    Sodium 140 136 - 145 mmol/L    Potassium 3.8 3.4 - 5.3 mmol/L    Chloride 103 98 - 107 mmol/L    Carbon Dioxide (CO2) 26 22 - 29 mmol/L    Anion Gap 11 7 - 15 mmol/L    Urea Nitrogen 9.5 8.0 - 23.0 mg/dL    Creatinine 0.74 0.67 - 1.17 mg/dL    Calcium 9.0 8.8 - 10.2 mg/dL    Glucose 91 70 - 99 mg/dL    Alkaline Phosphatase 64 40 - 129 U/L    AST 24 10 - 50 U/L    ALT 19 10 - 50 U/L    Protein Total 7.4 6.4 - 8.3 g/dL    Albumin 4.5 3.5 - 5.2 g/dL    Bilirubin Total 0.6 <=1.2 mg/dL    GFR Estimate >90 >60 mL/min/1.73m2      Comment:      Effective December 21, 2021 eGFRcr in adults is calculated using the 2021 CKD-EPI creatinine equation which includes age and gender (Calvin lira al., [place], DOI: 10.1056/SXNUpe3113393)   CBC with platelets   Result Value Ref Range    WBC Count 6.4 4.0 - 11.0 10e3/uL    RBC Count 4.78 4.40 - 5.90 10e6/uL    Hemoglobin 14.3 13.3 - 17.7 g/dL    Hematocrit 44.0 40.0 - 53.0 %    MCV 92 78 - 100 fL    MCH 29.9 26.5 - 33.0 pg    MCHC 32.5 31.5 - 36.5 g/dL    RDW 12.9 10.0 - 15.0 %    Platelet Count 261 150 - 450 10e3/uL   Lipid panel reflex to direct LDL Fasting   Result Value Ref Range    Cholesterol 178 <200 mg/dL    Triglycerides 111 <150 mg/dL    Direct Measure HDL 60 >=40 mg/dL    LDL Cholesterol Calculated 96 <=100 mg/dL    Non HDL Cholesterol 118 <130 mg/dL    Narrative    Cholesterol  Desirable:  <200  mg/dL    Triglycerides  Normal:  Less than 150 mg/dL  Borderline High:  150-199 mg/dL  High:  200-499 mg/dL  Very High:  Greater than or equal to 500 mg/dL    Direct Measure HDL  Female:  Greater than or equal to 50 mg/dL   Male:  Greater than or equal to 40 mg/dL    LDL Cholesterol  Desirable:  <100mg/dL  Above Desirable:  100-129 mg/dL   Borderline High:  130-159 mg/dL   High:  160-189 mg/dL   Very High:  >= 190 mg/dL    Non HDL Cholesterol  Desirable:  130 mg/dL  Above Desirable:  130-159 mg/dL  Borderline High:  160-189 mg/dL  High:  190-219 mg/dL  Very High:  Greater than or equal to 220 mg/dL       If you have any questions or concerns, please call the clinic at the number listed above.       Sincerely,      Jae Givens MD

## 2023-11-16 ENCOUNTER — PATIENT OUTREACH (OUTPATIENT)
Dept: CARE COORDINATION | Facility: CLINIC | Age: 63
End: 2023-11-16
Payer: COMMERCIAL

## 2023-11-30 ENCOUNTER — PATIENT OUTREACH (OUTPATIENT)
Dept: CARE COORDINATION | Facility: CLINIC | Age: 63
End: 2023-11-30
Payer: COMMERCIAL

## 2024-06-13 ENCOUNTER — PATIENT OUTREACH (OUTPATIENT)
Dept: CARE COORDINATION | Facility: CLINIC | Age: 64
End: 2024-06-13